# Patient Record
Sex: FEMALE | URBAN - METROPOLITAN AREA
[De-identification: names, ages, dates, MRNs, and addresses within clinical notes are randomized per-mention and may not be internally consistent; named-entity substitution may affect disease eponyms.]

---

## 2022-05-11 ENCOUNTER — OFFICE VISIT (OUTPATIENT)
Dept: OTOLARYNGOLOGY | Facility: CLINIC | Age: 87
End: 2022-05-11
Payer: COMMERCIAL

## 2022-05-11 DIAGNOSIS — H61.23 CERUMEN DEBRIS ON TYMPANIC MEMBRANE OF BOTH EARS: ICD-10-CM

## 2022-05-11 DIAGNOSIS — J38.01 UNILATERAL VOCAL CORD PARALYSIS: Primary | ICD-10-CM

## 2022-05-11 DIAGNOSIS — J37.0 CHRONIC ATROPHIC LARYNGITIS: ICD-10-CM

## 2022-05-11 PROCEDURE — 99203 OFFICE O/P NEW LOW 30 MIN: CPT | Performed by: SPECIALIST

## 2022-05-11 RX ORDER — DILTIAZEM HYDROCHLORIDE 60 MG/1
TABLET, FILM COATED ORAL
COMMUNITY

## 2022-05-11 RX ORDER — SOTALOL HYDROCHLORIDE 80 MG/1
TABLET ORAL
COMMUNITY

## 2022-05-11 RX ORDER — UBIDECARENONE 75 MG
CAPSULE ORAL
COMMUNITY

## 2022-05-11 RX ORDER — PANTOPRAZOLE SODIUM 40 MG/1
TABLET, DELAYED RELEASE ORAL
COMMUNITY

## 2022-05-11 RX ORDER — ASCORBIC ACID 100 MG
TABLET,CHEWABLE ORAL AS DIRECTED
COMMUNITY

## 2022-05-11 RX ORDER — CHOLECALCIFEROL (VITAMIN D3) 1250 MCG
CAPSULE ORAL AS DIRECTED
COMMUNITY

## 2022-05-11 RX ORDER — ATORVASTATIN CALCIUM 10 MG/1
TABLET, FILM COATED ORAL
COMMUNITY

## 2022-05-11 RX ORDER — CALCIUM CARBONATE 300MG(750)
TABLET,CHEWABLE ORAL
COMMUNITY
Start: 2021-12-27

## 2022-05-11 NOTE — PATIENT INSTRUCTIONS
Cerumen was fully cleaned from both your ears. You have a paralyzed left vocal cord. There is bowing of your bilateral vocal cords. Please try the home exercises that I demonstrated to you.   If you are not feeling better, speech therapy and/or injection of the vocal cords can be considered

## 2023-05-11 ENCOUNTER — OFFICE VISIT (OUTPATIENT)
Dept: OTOLARYNGOLOGY | Facility: CLINIC | Age: 88
End: 2023-05-11

## 2023-05-11 VITALS — BODY MASS INDEX: 29.95 KG/M2 | WEIGHT: 169 LBS | HEIGHT: 63 IN

## 2023-05-11 DIAGNOSIS — J38.01 UNILATERAL VOCAL CORD PARALYSIS: Primary | ICD-10-CM

## 2023-05-11 DIAGNOSIS — J37.0 CHRONIC ATROPHIC LARYNGITIS: ICD-10-CM

## 2023-05-11 DIAGNOSIS — H61.23 CERUMEN DEBRIS ON TYMPANIC MEMBRANE OF BOTH EARS: ICD-10-CM

## 2023-05-11 NOTE — PATIENT INSTRUCTIONS
Injection of the left true vocal cord with laryngeal gel if clearance is obtained. Ears were fully cleaned of cerumen. Follow-up in 6 months time, sooner if problems.

## 2023-11-16 ENCOUNTER — OFFICE VISIT (OUTPATIENT)
Dept: OTOLARYNGOLOGY | Facility: CLINIC | Age: 88
End: 2023-11-16

## 2023-11-16 VITALS — WEIGHT: 169 LBS | BODY MASS INDEX: 29.95 KG/M2 | HEIGHT: 63 IN | TEMPERATURE: 97 F

## 2023-11-16 DIAGNOSIS — J38.02 BILATERAL VOCAL CORD PARALYSIS: Primary | ICD-10-CM

## 2023-11-16 DIAGNOSIS — J37.0 CHRONIC ATROPHIC LARYNGITIS: ICD-10-CM

## 2023-11-16 PROCEDURE — 1160F RVW MEDS BY RX/DR IN RCRD: CPT | Performed by: SPECIALIST

## 2023-11-16 PROCEDURE — 1159F MED LIST DOCD IN RCRD: CPT | Performed by: SPECIALIST

## 2023-11-16 PROCEDURE — 99214 OFFICE O/P EST MOD 30 MIN: CPT | Performed by: SPECIALIST

## 2023-11-16 PROCEDURE — 31575 DIAGNOSTIC LARYNGOSCOPY: CPT | Performed by: SPECIALIST

## 2023-11-16 PROCEDURE — 3008F BODY MASS INDEX DOCD: CPT | Performed by: SPECIALIST

## 2023-11-16 NOTE — PATIENT INSTRUCTIONS
Both your vocal cords are now paralyzed. I did order a chest x-ray as well as CT of the neck to better evaluate this. Your last fiberoptic scope done in May showed a longstanding unilateral left-sided vocal cord paralysis with normal movement on the right.

## 2023-12-08 ENCOUNTER — TELEPHONE (OUTPATIENT)
Dept: OTOLARYNGOLOGY | Facility: CLINIC | Age: 88
End: 2023-12-08

## 2023-12-09 NOTE — TELEPHONE ENCOUNTER
Discussed with the patient. Will send a copy to Dr. Doron Mcwilliams. No tumor seen. She is tolerating the narrowed airway. No stridor. If her symptoms worsen, she may need an arytenoidectomy to open the airway.

## 2024-05-16 ENCOUNTER — OFFICE VISIT (OUTPATIENT)
Dept: OTOLARYNGOLOGY | Facility: CLINIC | Age: 89
End: 2024-05-16

## 2024-05-16 VITALS — BODY MASS INDEX: 30.12 KG/M2 | HEIGHT: 63 IN | WEIGHT: 170 LBS

## 2024-05-16 DIAGNOSIS — J38.01 UNILATERAL VOCAL CORD PARALYSIS: Primary | ICD-10-CM

## 2024-05-16 DIAGNOSIS — H61.23 CERUMEN DEBRIS ON TYMPANIC MEMBRANE OF BOTH EARS: ICD-10-CM

## 2024-05-16 PROCEDURE — 99213 OFFICE O/P EST LOW 20 MIN: CPT | Performed by: SPECIALIST

## 2024-05-16 PROCEDURE — 31575 DIAGNOSTIC LARYNGOSCOPY: CPT | Performed by: SPECIALIST

## 2024-05-17 NOTE — PATIENT INSTRUCTIONS
You had a left true vocal cord paralysis by fiberoptic exam.  The right cord now appears to be mobile.  Your ears were also cleaned of cerumen.  Follow-up in 6 months time, sooner if problems.

## 2024-05-17 NOTE — PROGRESS NOTES
Ana Laura Buck is a 89 year old female.   Chief Complaint   Patient presents with    Follow - Up     bilateral vocal cord paralysis     HPI:   Patient to get her ears cleaned and larynx rechecked    Current Outpatient Medications   Medication Sig Dispense Refill    Magnesium 400 MG Oral Tab TAKE 1 TABLET BY MOUTH EVERY DAY      cyanocobalamine 100 MCG Oral Tab Vitamin B-12      Cholecalciferol (VITAMIN D3) 1.25 MG (27982 UT) Oral Cap As Directed.      Ascorbic Acid (VITAMIN C) 100 MG Oral Chew Tab As Directed.      apixaban 2.5 MG Oral Tab Eliquis 2.5 mg tablet   TAKE 1 TABLET BY MOUTH TWICE DAILY      atorvastatin 10 MG Oral Tab atorvastatin 10 mg tablet   TAKE 1 TABLET BY MOUTH EVERY DAY      dilTIAZem 60 MG Oral Tab diltiazem 60 mg tablet   TAKE 1 TABLET BY MOUTH EVERY DAY      pantoprazole 40 MG Oral Tab EC pantoprazole 40 mg tablet,delayed release   TAKE 1 TABLET BY MOUTH EVERY DAY      sotalol 80 MG Oral Tab sotalol 80 mg tablet   TAKE 1 TABLET BY MOUTH TWICE DAILY      lisinopril (PRINIVIL,ZESTRIL) 20 MG Oral Tab Take 1 tablet (20 mg total) by mouth daily.      PEG 3350-KCl-NaBcb-NaCl-NaSulf (COLYTE WITH FLAVOR PACKS) 240 G Oral Recon Soln Take as directed. Generic or substitute okay 1 Bottle 0      Past Medical History:    Arthritis    Unspecified essential hypertension      Social History:  Social History     Socioeconomic History    Marital status: Single   Tobacco Use    Smoking status: Former     Current packs/day: 0.00     Types: Cigarettes     Quit date: 1980     Years since quittin.0    Smokeless tobacco: Never   Substance and Sexual Activity    Alcohol use: Yes     Comment: rarely     Social Determinants of Health      [facility]    Social Connections    [facility], [facility]    Housing Stability        REVIEW OF SYSTEMS:   GENERAL HEALTH: feels well otherwise  GENERAL :  denies fever, chills, sweats, weight loss, weight gain  SKIN: denies any unusual skin lesions or rashes  RESPIRATORY: denies shortness of breath with exertion  NEURO: denies headaches    EXAM:   Ht 5' 3\" (1.6 m)   Wt 170 lb (77.1 kg)   BMI 30.11 kg/m²   System Details   Skin Inspection - Normal.   Constitutional Overall appearance - Normal.   Head/Face Facial features - Normal. Eyebrows - Normal. Skull - Normal.   Eyes Conjunctiva - Right: Normal, Left: Normal. Pupil - Right: Normal, Left: Normal.    Ears Inspection - Right: Normal, Left: Normal.   Ears = bilateral cerumen occlussions.    Fully cleaned under microscope using instrumentation and suctioning.    Normal tympanic membranes.     Nasal External nose - Normal.   Nasal septum - Normal.  Turbinates - Normal   Oral/Oropharynx Lips - Normal, Tonsils - Normal, Tongue - Normal    Neck Exam Inspection - Normal. Palpation - Normal. Parotid gland - Normal. Thyroid gland - Normal.   Lymph Detail Submental. Submandibular. Anterior cervical. Posterior cervical. Supraclavicular.  All without enlargement   Psychiatric Orientation - Oriented to time, place, person & situation. Appropriate mood and affect.   Neurological Memory - Normal. Cranial nerves - Cranial nerves II through XII grossly intact.   Larynx Consent was obtained for the procedure.  The nose was asesthetized with 1% neosynephrine and 4% lidocaine topical drops.    The scope was placed into the bilateral nares as well as the nasopharynx, hypopharynx and larynx.    All of which were examined.  The  entire larynx including the vallecula, epiglottis, true and false vocal cords, aryepiglottic folds, piriform sinuses and post cricord area were examined for tumors and infectious processes as well as for evidence of reflux and retained secretions.  Vocal cord mobility was also assessed.    Any abnormalities are noted in the exam section.  Complete paralysis of the left true vocal cord which is also  shortened.  Right vocal cord mobile by fiberoptic exam.  No tumors or masses seen.  No retained secretions.   Nasopharynx Normal by fiberoptic exam     ASSESSMENT AND PLAN:   1. Unilateral vocal cord paralysis  On the left with a shortened left true vocal cord and mobile right true vocal cord.  In the past both vocal cords seem to be impaired.    2. Cerumen debris on tympanic membrane of both ears  Fully cleaned.  Follow-up in 6 months time, sooner if problems.      The patient indicates understanding of these issues and agrees to the plan.      Tasia Florian MD  5/16/2024  8:49 PM

## 2024-12-02 ENCOUNTER — TELEPHONE (OUTPATIENT)
Dept: OTOLARYNGOLOGY | Facility: CLINIC | Age: 89
End: 2024-12-02

## 2024-12-02 NOTE — TELEPHONE ENCOUNTER
I called and left a message.  They can try a smart swab or tvidler.  It might help and shouldn't cause any damage.

## 2024-12-02 NOTE — TELEPHONE ENCOUNTER
Dr. Florian, Ana Laura is now in hospice and her friend Tamar wants to know what she can do with Ana Laura's ear wax problem? She was asking you to call her. Please advise.

## 2024-12-02 NOTE — TELEPHONE ENCOUNTER
Per Tamar states patient is currently in hospice care, requesting to speak to Dr. Florian, would like to discuss ear wax problem, and what she can do to help with this. Please call thank you.

## 2024-12-09 ENCOUNTER — TELEPHONE (OUTPATIENT)
Dept: OTOLARYNGOLOGY | Facility: CLINIC | Age: 89
End: 2024-12-09

## 2024-12-10 NOTE — TELEPHONE ENCOUNTER
Dr. Florian, Ana Laura and her friend didn't get the smart swab or Tvidler, they got something else and they don't think it's doing the job.  Ana Laura wants to know what you recommend they use to soften her wax and help them remove it. Please advise.

## 2024-12-11 ENCOUNTER — TELEPHONE (OUTPATIENT)
Dept: OTOLARYNGOLOGY | Facility: CLINIC | Age: 89
End: 2024-12-11

## 2024-12-11 NOTE — TELEPHONE ENCOUNTER
Left a message for Ana Laura and Tamar regarding using Debrox or Ceruminex to help soften the ear wax in Ana Laura's ears. But per Dr. Florian, only use for 3 days to soften.